# Patient Record
Sex: MALE | Race: WHITE | URBAN - METROPOLITAN AREA
[De-identification: names, ages, dates, MRNs, and addresses within clinical notes are randomized per-mention and may not be internally consistent; named-entity substitution may affect disease eponyms.]

---

## 2017-12-04 ENCOUNTER — TRANSFERRED RECORDS (OUTPATIENT)
Dept: HEALTH INFORMATION MANAGEMENT | Facility: CLINIC | Age: 57
End: 2017-12-04

## 2017-12-29 NOTE — TELEPHONE ENCOUNTER
APPT INFO    Date /Time: 1/17/18 at 9AM   Reason for Appt: Otosclerosis   Ref Provider/Clinic: Jose F Turpin   Are there internal records? Yes/No?  IF YES, list clinic names: No   Are there outside records? Yes/No? [facility]   Patient Contact (Y/N) & Call Details: No   Action: CareEverywhere records reviewed. See CareEverywhere Tab.       OUTSIDE RECORDS CHECKLIST     CLINIC NAME COMMENTS REC (x) IMG (x)   [facility] -Care Everywhere Office note 12/4/17  Audiogram 12/4/17 X none

## 2018-01-11 DIAGNOSIS — H80.90 OTOSCLEROSIS: Primary | ICD-10-CM

## 2018-01-12 NOTE — TELEPHONE ENCOUNTER
Records Received From:  [facility]    DATE/EXAM/LOCATION  (specify location if different)   Audiogram: Audiograph 12/4/17

## 2018-01-17 ENCOUNTER — OFFICE VISIT (OUTPATIENT)
Dept: AUDIOLOGY | Facility: CLINIC | Age: 58
End: 2018-01-17
Payer: COMMERCIAL

## 2018-01-17 ENCOUNTER — PRE VISIT (OUTPATIENT)
Dept: OTOLARYNGOLOGY | Facility: CLINIC | Age: 58
End: 2018-01-17

## 2018-01-17 ENCOUNTER — OFFICE VISIT (OUTPATIENT)
Dept: OTOLARYNGOLOGY | Facility: CLINIC | Age: 58
End: 2018-01-17
Payer: COMMERCIAL

## 2018-01-17 VITALS — BODY MASS INDEX: 29.82 KG/M2 | WEIGHT: 225 LBS | HEIGHT: 73 IN

## 2018-01-17 DIAGNOSIS — H90.72 MIXED CONDUCTIVE AND SENSORINEURAL HEARING LOSS OF LEFT EAR WITH UNRESTRICTED HEARING OF RIGHT EAR: Primary | ICD-10-CM

## 2018-01-17 DIAGNOSIS — H80.92 OTOSCLEROSIS OF LEFT EAR: Primary | ICD-10-CM

## 2018-01-17 RX ORDER — NAPROXEN SODIUM 220 MG
440 TABLET ORAL
COMMUNITY

## 2018-01-17 ASSESSMENT — PAIN SCALES - GENERAL: PAINLEVEL: MILD PAIN (2)

## 2018-01-17 NOTE — PATIENT INSTRUCTIONS
Patient to review pre operative information.   Patient to schedule a pre-op physical with their primary MD or with our Preoperative Assessment Clinic within 30 days of the procedure.    Patient to avoid blood thinning medications 1 week prior to surgery (Ibuprofen, Aleve, Aspirin, fish oil )   Use the antiseptic scrub as directed.   You will need to schedule a post operative appointment for 3 weeks after surgery    Patient to call the ENT clinic with further questions or concerns:   ENT Triage Nurse  [phone] option 3  ENT appointment scheduling [phone] option 1  ENT surgery scheduling, Brianda [phone]  ENT Nurse Maggie [phone]

## 2018-01-17 NOTE — MR AVS SNAPSHOT
After Visit Summary   1/17/2018    Fermín Pineda    MRN: [id number]           Patient Information     Date Of Birth          1960        Visit Information        Provider Department      1/17/2018 8:30 AM Crista Gnozalez, Nadya BALES [facility] Audiology        Today's Diagnoses     Mixed conductive and sensorineural hearing loss of left ear with unrestricted hearing of right ear    -  1       Follow-ups after your visit        Who to contact     Please call your clinic at [phone] to:    Ask questions about your health    Make or cancel appointments    Discuss your medicines    Learn about your test results    Speak to your doctor   If you have compliments or concerns about an experience at your clinic, or if you wish to file a complaint, please contact [facility] Patient Relations at [phone] or email us at [email or web address]         Additional Information About Your Visit        MyChart Information     [employer] gives you secure access to your electronic health record. If you see a primary care provider, you can also send messages to your care team and make appointments. If you have questions, please call your primary care clinic.  If you do not have a primary care provider, please call [phone] and they will assist you.      [employer] is an electronic gateway that provides easy, online access to your medical records. With [employer], you can request a clinic appointment, read your test results, renew a prescription or communicate with your care team.     To access your existing account, please contact your [facility] or call [phone] for assistance.        Care EveryWhere ID     This is your Care EveryWhere ID. This could be used by other organizations to access your [facility] medical records  [id number]         Blood Pressure from Last 3 Encounters:   No data found for BP    Weight from Last 3 Encounters:   01/17/18 102.1  kg (225 lb)              We Performed the Following     AUDIOGRAM/TYMPANOGRAM - INTERFACE     [facility] Audiometry Thrshld Eval & Speech Recog (85426)     Tymps / Reflex   ([id number])        Primary Care Provider    None Specified       No primary provider on file.        Equal Access to Services     NAGI SINHA : Hadii andrés laird hadshakeelo Soomaali, waaxda luqadaha, qaybta kaalmada adeegyada, waxantonette idiin haycatalinon adeserina natarajan cristian tillman. So [facility] [phone].    ATENCIÓN: Si habla español, tiene a mayers disposición servicios gratuitos de asistencia lingüística. Llame al [phone].    We comply with applicable federal civil rights laws and [place] laws. We do not discriminate on the basis of race, color, national origin, age, disability, sex, sexual orientation, or gender identity.            Thank you!     Thank you for choosing [facility] AUDIOLOGY  for your care. Our goal is always to provide you with excellent care. Hearing back from our patients is one way we can continue to improve our services. Please take a few minutes to complete the written survey that you may receive in the mail after your visit with us. Thank you!             Your Updated Medication List - Protect others around you: Learn how to safely use, store and throw away your medicines at www.disposemymeds.org.          This list is accurate as of: 1/17/18 11:08 AM.  Always use your most recent med list.                   Brand Name Dispense Instructions for use Diagnosis    naproxen sodium 220 MG tablet    ANAPROX     Take 440 mg by mouth

## 2018-01-17 NOTE — PROGRESS NOTES
AUDIOLOGY REPORT    SUMMARY: Audiology visit completed. See audiogram for results.      RECOMMENDATIONS: Follow-up with ENT.    Felecia Steen, [facility] [place]  Licensed Audiologist  [place] License #[id number]

## 2018-01-17 NOTE — PROGRESS NOTES
Fermín MONICA Pineda is seen in consultation from Dr. Turpin.  He is a 57 year old male being seen for left hearing loss.  He reports hearing loss since he was in college which has progressively worsened.  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  He has autophony to his own voice on the left although no tinnitus on the left.  He has some tinnitus on the right.  No otalgia or otorrhea.  He reports that he had a lot of ear infections as a child which improved by the time he was in [employer].  No ear surgeries.    Past Medical History:   Diagnosis Date     Allergic rhinitis 1970    use Benadryl or Rhinocort when symptoms require     Arthritis 1975    progressive degenerative spinal - 3 surgeries     Conductive hearing loss 1990    has become progressively worse only in left ear     Hearing problem     as noted previously - progressive loss in left ear     Hypertension 1982    resolved w/medication which I have not needed since 1990     Migraines 1985    infrequent - last one approx 10 years ago     Sleep apnea 2005    use CPAP every night religiously     Thyroid disease 2000    temporary hyperthyroid condition post-surgery resolved w/med     Uncomplicated asthma 1985    resolved w/Ventolin inhaler - not used in many years now       Past Surgical History:   Procedure Laterality Date     HEAD & NECK SURGERY  2000, 2017    lumbar laminectomy 2000 & 2017, Cervical fusion 2017     ORTHOPEDIC SURGERY  1975    Right knee surgery for sports injury       FHx:  His children had PE tubes x 2 as young children, his youngest son has hearing loss which they think is secondary to noise exposure in the , his grandmother was essentially deaf which occurred after she survived the flu epidemic.    Social History   Substance Use Topics     Smoking status: Light Tobacco Smoker     Packs/day: 0.50     Years: 40.00     Types: Cigarettes     Last attempt to quit: 1/12/2018      Smokeless tobacco: Not on file     Alcohol use Yes      Comment: beer / wine 6 per week       Patient Supplied Answers to Review of Systems   ENT ROS 1/12/2018   Neurology Numbness   Ears, Nose, Throat Hearing loss, Ear pain, Ringing/noise in ears, Nasal congestion or drainage   Musculoskeletal Sore or stiff joints, Back pain, Neck pain   The remainder of the 10 point review of systems is otherwise negative.    Physical examination:  Constitutional:  In no acute distress, appears stated age  Eyes:  Extraocular movements intact, no spontaneous nystagmus  Ears:  Both ears examined under the microscope.  Ear canals clear, TMs intact with aerated middle ears, very minimal epitympanic retractions.  Singleton to the left.  Rinne showed bone greater than air on the left, air greater than bone on the right.  Respiratory:  No increased work of breathing, wheezing or stridor  Musculoskeletal:  Good upper extremity strength  Skin:  No rashes on the head and neck  Neurologic:  House Brackman 1/6 bilaterally, ambulating normally  Psychiatric:  Alert, normal affect, answering questions appropriately    Audiogram:  Normal right hearing.  Left moderate upsloping to mild/moderate conductive hearing loss.  100% speech discrimination bilaterally, normal tympanograms, absent reflexes throughout.    Assessment and plan:  Likely left otosclerosis.  He recalled seeing an ENT 20 years ago and was told his inner ear hearing was fine and that his hearing loss was not bad enough for surgery.  He does not have a severe enough conductive hearing loss to warrant surgical intervention.  Left stapedotomy was discussed.  The risks and benefits were discussed.  The risks include but are not limited to:  Worsened hearing which may require further surgery, profound and irreversible hearing loss, dizziness, damage to the taste nerve, damage to the facial nerve, tympanic membrane perforation requiring further surgery and infection.  Postoperative  restrictions were discussed.  He had his questions answered and would like to proceed.  He would like to have it done under MAC plus local.

## 2018-01-17 NOTE — NURSING NOTE
Teaching Flowsheet - ENT   Relevant Diagnosis: otosclerosis-   Teaching Topic:left stapes  Person(s) involved in teaching:  Patient     Motivation Level:  Asks Questions:   Yes  Eager to Learn:   Yes  Cooperative:   Yes  Receptive (willing/able to accept information):   Yes  Comments: Reviewed pre-op H and P,  NPO prior to  surgery,  pre-op scrub (given Hibiclens)  Reviewed post-op  cares including  ear/wound care, activity and pain.     Patient demonstrates understanding of the following:  Reason for the appointment, diagnosis and treatment plan:   Yes  Knowledge of proper use of medications and conditions for which they are ordered (with special attention to potential side effects or drug interactions):  stop aspirin products 1 week before surgery Yes  Which situations necessitate calling provider and whom to contact:   Yes  Nutritional needs and diet plan:   Yes  Pain management techniques:   Yes  Patient instructed on hand hygiene:  Yes  How and/when to access community resources:   Yes     Infection Prevention:  Patient   demonstrates understanding of the following:  Surgical procedure site care taught Yes  Signs and symptoms of infection taught Yes  Wound care taught Yes  Instructional Materials Used/Given: pre- op booklet, ear surgery  handout and verbal  Instruction.  Works involves computers/phone.

## 2018-01-17 NOTE — NURSING NOTE
"Chief Complaint   Patient presents with     Consult     Otosclerosis of left ear      Height 1.854 m (6' 1\"), weight 102.1 kg (225 lb).    Barron Martin    "

## 2018-01-17 NOTE — LETTER
1/17/2018       RE: Fermín Pineda  [address]  [place] [address]     Dear Colleague,    Thank you for referring your patient, Fermín Pineda, to the [facility] EAR NOSE AND THROAT at [facility]. Please see a copy of my visit note below.    Fermín Pineda is seen in consultation from Dr. Turpin.  He is a 57 year old male being seen for left hearing loss.  He reports hearing loss since he was in college which has progressively worsened.  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  He has autophony to his own voice on the left although no tinnitus on the left.  He has some tinnitus on the right.  No otalgia or otorrhea.  He reports that he had a lot of ear infections as a child which improved by the time he was in [employer].  No ear surgeries.    Past Medical History:   Diagnosis Date     Allergic rhinitis 1970    use Benadryl or Rhinocort when symptoms require     Arthritis 1975    progressive degenerative spinal - 3 surgeries     Conductive hearing loss 1990    has become progressively worse only in left ear     Hearing problem     as noted previously - progressive loss in left ear     Hypertension 1982    resolved w/medication which I have not needed since 1990     Migraines 1985    infrequent - last one approx 10 years ago     Sleep apnea 2005    use CPAP every night religiously     Thyroid disease 2000    temporary hyperthyroid condition post-surgery resolved w/med     Uncomplicated asthma 1985    resolved w/Ventolin inhaler - not used in many years now       Past Surgical History:   Procedure Laterality Date     HEAD & NECK SURGERY  2000, 2017    lumbar laminectomy 2000 & 2017, Cervical fusion 2017     ORTHOPEDIC SURGERY  1975    Right knee surgery for sports injury       FHx:  His children had PE tubes x 2 as young children, his youngest son has hearing loss which they think is secondary to noise  exposure in the , his grandmother was essentially deaf which occurred after she survived the flu epidemic.    Social History   Substance Use Topics     Smoking status: Light Tobacco Smoker     Packs/day: 0.50     Years: 40.00     Types: Cigarettes     Last attempt to quit: 1/12/2018     Smokeless tobacco: Not on file     Alcohol use Yes      Comment: beer / wine 6 per week       Patient Supplied Answers to Review of Systems  [facility] ENT ROS 1/12/2018   Neurology Numbness   Ears, Nose, Throat Hearing loss, Ear pain, Ringing/noise in ears, Nasal congestion or drainage   Musculoskeletal Sore or stiff joints, Back pain, Neck pain   The remainder of the 10 point review of systems is otherwise negative.    Physical examination:  Constitutional:  In no acute distress, appears stated age  Eyes:  Extraocular movements intact, no spontaneous nystagmus  Ears:  Both ears examined under the microscope.  Ear canals clear, TMs intact with aerated middle ears, very minimal epitympanic retractions.  Singleton to the left.  Rinne showed bone greater than air on the left, air greater than bone on the right.  Respiratory:  No increased work of breathing, wheezing or stridor  Musculoskeletal:  Good upper extremity strength  Skin:  No rashes on the head and neck  Neurologic:  House Brackman 1/6 bilaterally, ambulating normally  Psychiatric:  Alert, normal affect, answering questions appropriately    Audiogram:  Normal right hearing.  Left moderate upsloping to mild/moderate conductive hearing loss.  100% speech discrimination bilaterally, normal tympanograms, absent reflexes throughout.    Assessment and plan:  Likely left otosclerosis.  He recalled seeing an ENT 20 years ago and was told his inner ear hearing was fine and that his hearing loss was not bad enough for surgery.  He does not have a severe enough conductive hearing loss to warrant surgical intervention.  Left stapedotomy was discussed.  The risks and benefits were discussed.   The risks include but are not limited to:  Worsened hearing which may require further surgery, profound and irreversible hearing loss, dizziness, damage to the taste nerve, damage to the facial nerve, tympanic membrane perforation requiring further surgery and infection.  Postoperative restrictions were discussed.  He had his questions answered and would like to proceed.  He would like to have it done under MAC plus local.    Again, thank you for allowing me to participate in the care of your patient.      Sincerely,    Brina Brambila MD

## 2018-01-17 NOTE — MR AVS SNAPSHOT
After Visit Summary   1/17/2018    Fermín Pineda    MRN: [id number]           Patient Information     Date Of Birth          1960        Visit Information        Provider Department      1/17/2018 9:00 AM Brina Brambila MD [facility] Ear Nose and Throat        Care Instructions     Patient to review pre operative information.   Patient to schedule a pre-op physical with their primary MD or with our Preoperative Assessment Clinic within 30 days of the procedure.    Patient to avoid blood thinning medications 1 week prior to surgery (Ibuprofen, Aleve, Aspirin, fish oil )   Use the antiseptic scrub as directed.   You will need to schedule a post operative appointment for 3 weeks after surgery    Patient to call the ENT clinic with further questions or concerns:   ENT Triage Nurse  [phone] option 3  ENT appointment scheduling [phone] option 1  ENT surgery scheduling, Brianda [phone]  ENT Nurse Maggie [phone]          Follow-ups after your visit        Who to contact     Please call your clinic at [phone] to:    Ask questions about your health    Make or cancel appointments    Discuss your medicines    Learn about your test results    Speak to your doctor   If you have compliments or concerns about an experience at your clinic, or if you wish to file a complaint, please contact [facility] Patient Relations at [phone] or email us at [email or web address]         Additional Information About Your Visit        MyChart Information     [employer] gives you secure access to your electronic health record. If you see a primary care provider, you can also send messages to your care team and make appointments. If you have questions, please call your primary care clinic.  If you do not have a primary care provider, please call [phone] and they will assist you.      [employer] is an electronic gateway that provides easy, online access to your medical  "records. With [employer], you can request a clinic appointment, read your test results, renew a prescription or communicate with your care team.     To access your existing account, please contact your [facility] or call [phone] for assistance.        Care EveryWhere ID     This is your Care EveryWhere ID. This could be used by other organizations to access your [facility] medical records  [id number]        Your Vitals Were     Height BMI (Body Mass Index)                1.854 m (6' 1\") 29.69 kg/m2           Blood Pressure from Last 3 Encounters:   No data found for BP    Weight from Last 3 Encounters:   01/17/18 102.1 kg (225 lb)              Today, you had the following     No orders found for display       Primary Care Provider    None Specified       No primary provider on file.        Equal Access to Services     NAGI SINHA : Yusuf Ritchie, waaxda luqadaha, qaybta kaalmada aedline, elia foster . So [facility] [phone].    ATENCIÓN: Si habla español, tiene a mayers disposición servicios gratuitos de asistencia lingüística. Llame al [phone].    We comply with applicable federal civil rights laws and [place] laws. We do not discriminate on the basis of race, color, national origin, age, disability, sex, sexual orientation, or gender identity.            Thank you!     Thank you for choosing [facility] EAR NOSE AND THROAT  for your care. Our goal is always to provide you with excellent care. Hearing back from our patients is one way we can continue to improve our services. Please take a few minutes to complete the written survey that you may receive in the mail after your visit with us. Thank you!             Your Updated Medication List - Protect others around you: Learn how to safely use, store and throw away your medicines at www.disposemymeds.org.          This list is accurate as of: 1/17/18  9:33 AM.  Always use your most recent " med list.                   Brand Name Dispense Instructions for use Diagnosis    naproxen sodium 220 MG tablet    ANAPROX     Take 440 mg by mouth

## 2018-01-23 ENCOUNTER — TELEPHONE (OUTPATIENT)
Dept: OTOLARYNGOLOGY | Facility: CLINIC | Age: 58
End: 2018-01-23

## 2018-01-23 NOTE — TELEPHONE ENCOUNTER
1/23/18  Call out to patient    Surgery scheduled with Dr Brambila 4/11/18 at the [facility] for Lt Stapedotomy    PAC call 4/9/18  3:30 pm    PE: Dr Fermín Rojas - Sae Villatoro    Post Op - 5/3/18 8:30 am Dr Brambila    Teaching and soap done by Maggie RODRIGUEZ  ENT RNCNC    Lorena Redmond   ENT Sonam-Op Coordinator  [phone]

## 2018-07-26 ENCOUNTER — ANESTHESIA EVENT (OUTPATIENT)
Dept: SURGERY | Facility: AMBULATORY SURGERY CENTER | Age: 58
End: 2018-07-26

## 2018-07-27 ENCOUNTER — HOSPITAL ENCOUNTER (OUTPATIENT)
Facility: AMBULATORY SURGERY CENTER | Age: 58
End: 2018-07-27
Attending: OTOLARYNGOLOGY
Payer: COMMERCIAL

## 2018-07-27 ENCOUNTER — SURGERY (OUTPATIENT)
Age: 58
End: 2018-07-27
Payer: COMMERCIAL

## 2018-07-27 ENCOUNTER — ANESTHESIA (OUTPATIENT)
Dept: SURGERY | Facility: AMBULATORY SURGERY CENTER | Age: 58
End: 2018-07-27

## 2018-07-27 VITALS
WEIGHT: 214 LBS | BODY MASS INDEX: 28.36 KG/M2 | SYSTOLIC BLOOD PRESSURE: 135 MMHG | RESPIRATION RATE: 16 BRPM | OXYGEN SATURATION: 95 % | TEMPERATURE: 97 F | DIASTOLIC BLOOD PRESSURE: 86 MMHG | HEART RATE: 65 BPM | HEIGHT: 73 IN

## 2018-07-27 DIAGNOSIS — H80.92 OTOSCLEROSIS OF LEFT EAR: Primary | ICD-10-CM

## 2018-07-27 DIAGNOSIS — G89.18 POSTOPERATIVE PAIN: ICD-10-CM

## 2018-07-27 DEVICE — IMPLANTABLE DEVICE: Type: IMPLANTABLE DEVICE | Site: EAR | Status: FUNCTIONAL

## 2018-07-27 RX ORDER — PROPOFOL 10 MG/ML
INJECTION, EMULSION INTRAVENOUS PRN
Status: DISCONTINUED | OUTPATIENT
Start: 2018-07-27 | End: 2018-07-27

## 2018-07-27 RX ORDER — FENTANYL CITRATE 50 UG/ML
25-50 INJECTION, SOLUTION INTRAMUSCULAR; INTRAVENOUS
Status: DISCONTINUED | OUTPATIENT
Start: 2018-07-27 | End: 2018-07-27 | Stop reason: HOSPADM

## 2018-07-27 RX ORDER — OFLOXACIN 3 MG/ML
5 SOLUTION AURICULAR (OTIC) 2 TIMES DAILY
Qty: 7 ML | Refills: 0 | Status: SHIPPED | OUTPATIENT
Start: 2018-08-03 | End: 2018-08-17

## 2018-07-27 RX ORDER — ONDANSETRON 4 MG/1
4 TABLET, ORALLY DISINTEGRATING ORAL EVERY 30 MIN PRN
Status: DISCONTINUED | OUTPATIENT
Start: 2018-07-27 | End: 2018-07-28 | Stop reason: HOSPADM

## 2018-07-27 RX ORDER — FENTANYL CITRATE 50 UG/ML
INJECTION, SOLUTION INTRAMUSCULAR; INTRAVENOUS PRN
Status: DISCONTINUED | OUTPATIENT
Start: 2018-07-27 | End: 2018-07-27

## 2018-07-27 RX ORDER — SODIUM CHLORIDE, SODIUM LACTATE, POTASSIUM CHLORIDE, CALCIUM CHLORIDE 600; 310; 30; 20 MG/100ML; MG/100ML; MG/100ML; MG/100ML
INJECTION, SOLUTION INTRAVENOUS CONTINUOUS
Status: DISCONTINUED | OUTPATIENT
Start: 2018-07-27 | End: 2018-07-27 | Stop reason: HOSPADM

## 2018-07-27 RX ORDER — ACETAMINOPHEN 325 MG/1
650 TABLET ORAL
Status: DISCONTINUED | OUTPATIENT
Start: 2018-07-27 | End: 2018-07-28 | Stop reason: HOSPADM

## 2018-07-27 RX ORDER — ONDANSETRON 2 MG/ML
4 INJECTION INTRAMUSCULAR; INTRAVENOUS EVERY 30 MIN PRN
Status: DISCONTINUED | OUTPATIENT
Start: 2018-07-27 | End: 2018-07-28 | Stop reason: HOSPADM

## 2018-07-27 RX ORDER — LIDOCAINE HYDROCHLORIDE 20 MG/ML
INJECTION, SOLUTION INFILTRATION; PERINEURAL PRN
Status: DISCONTINUED | OUTPATIENT
Start: 2018-07-27 | End: 2018-07-27

## 2018-07-27 RX ORDER — PROPOFOL 10 MG/ML
INJECTION, EMULSION INTRAVENOUS CONTINUOUS PRN
Status: DISCONTINUED | OUTPATIENT
Start: 2018-07-27 | End: 2018-07-27

## 2018-07-27 RX ORDER — ACETAMINOPHEN 325 MG/1
975 TABLET ORAL ONCE
Status: COMPLETED | OUTPATIENT
Start: 2018-07-27 | End: 2018-07-27

## 2018-07-27 RX ORDER — SODIUM CHLORIDE, SODIUM LACTATE, POTASSIUM CHLORIDE, CALCIUM CHLORIDE 600; 310; 30; 20 MG/100ML; MG/100ML; MG/100ML; MG/100ML
INJECTION, SOLUTION INTRAVENOUS CONTINUOUS
Status: DISCONTINUED | OUTPATIENT
Start: 2018-07-27 | End: 2018-07-28 | Stop reason: HOSPADM

## 2018-07-27 RX ORDER — HYDROCODONE BITARTRATE AND ACETAMINOPHEN 5; 325 MG/1; MG/1
1 TABLET ORAL
Status: DISCONTINUED | OUTPATIENT
Start: 2018-07-27 | End: 2018-07-28 | Stop reason: HOSPADM

## 2018-07-27 RX ORDER — KETOROLAC TROMETHAMINE 30 MG/ML
30 INJECTION, SOLUTION INTRAMUSCULAR; INTRAVENOUS EVERY 6 HOURS PRN
Status: DISCONTINUED | OUTPATIENT
Start: 2018-07-27 | End: 2018-07-28 | Stop reason: HOSPADM

## 2018-07-27 RX ORDER — GABAPENTIN 300 MG/1
300 CAPSULE ORAL ONCE
Status: COMPLETED | OUTPATIENT
Start: 2018-07-27 | End: 2018-07-27

## 2018-07-27 RX ORDER — NALOXONE HYDROCHLORIDE 0.4 MG/ML
.1-.4 INJECTION, SOLUTION INTRAMUSCULAR; INTRAVENOUS; SUBCUTANEOUS
Status: DISCONTINUED | OUTPATIENT
Start: 2018-07-27 | End: 2018-07-28 | Stop reason: HOSPADM

## 2018-07-27 RX ORDER — ONDANSETRON 2 MG/ML
INJECTION INTRAMUSCULAR; INTRAVENOUS PRN
Status: DISCONTINUED | OUTPATIENT
Start: 2018-07-27 | End: 2018-07-27

## 2018-07-27 RX ORDER — HYDROCODONE BITARTRATE AND ACETAMINOPHEN 5; 325 MG/1; MG/1
1 TABLET ORAL EVERY 6 HOURS PRN
Qty: 8 TABLET | Refills: 0 | Status: SHIPPED | OUTPATIENT
Start: 2018-07-27

## 2018-07-27 RX ORDER — DEXAMETHASONE SODIUM PHOSPHATE 10 MG/ML
10 INJECTION, SOLUTION INTRAMUSCULAR; INTRAVENOUS ONCE
Status: COMPLETED | OUTPATIENT
Start: 2018-07-27 | End: 2018-07-27

## 2018-07-27 RX ORDER — OXYCODONE HYDROCHLORIDE 5 MG/1
5 TABLET ORAL EVERY 4 HOURS PRN
Status: DISCONTINUED | OUTPATIENT
Start: 2018-07-27 | End: 2018-07-28 | Stop reason: HOSPADM

## 2018-07-27 RX ORDER — MEPERIDINE HYDROCHLORIDE 25 MG/ML
12.5 INJECTION INTRAMUSCULAR; INTRAVENOUS; SUBCUTANEOUS
Status: DISCONTINUED | OUTPATIENT
Start: 2018-07-27 | End: 2018-07-28 | Stop reason: HOSPADM

## 2018-07-27 RX ADMIN — DEXAMETHASONE SODIUM PHOSPHATE 4 MG: 10 INJECTION, SOLUTION INTRAMUSCULAR; INTRAVENOUS at 07:12

## 2018-07-27 RX ADMIN — GABAPENTIN 300 MG: 300 CAPSULE ORAL at 06:16

## 2018-07-27 RX ADMIN — SODIUM CHLORIDE, SODIUM LACTATE, POTASSIUM CHLORIDE, CALCIUM CHLORIDE: 600; 310; 30; 20 INJECTION, SOLUTION INTRAVENOUS at 06:17

## 2018-07-27 RX ADMIN — LIDOCAINE HYDROCHLORIDE 100 MG: 20 INJECTION, SOLUTION INFILTRATION; PERINEURAL at 07:12

## 2018-07-27 RX ADMIN — PROPOFOL 200 MCG/KG/MIN: 10 INJECTION, EMULSION INTRAVENOUS at 07:12

## 2018-07-27 RX ADMIN — OXYCODONE HYDROCHLORIDE 5 MG: 5 TABLET ORAL at 08:33

## 2018-07-27 RX ADMIN — PROPOFOL 100 MG: 10 INJECTION, EMULSION INTRAVENOUS at 07:25

## 2018-07-27 RX ADMIN — ONDANSETRON 4 MG: 2 INJECTION INTRAMUSCULAR; INTRAVENOUS at 07:26

## 2018-07-27 RX ADMIN — FENTANYL CITRATE 50 MCG: 50 INJECTION, SOLUTION INTRAMUSCULAR; INTRAVENOUS at 07:25

## 2018-07-27 RX ADMIN — PROPOFOL 200 MG: 10 INJECTION, EMULSION INTRAVENOUS at 07:13

## 2018-07-27 RX ADMIN — ACETAMINOPHEN 975 MG: 325 TABLET ORAL at 06:16

## 2018-07-27 NOTE — ANESTHESIA POSTPROCEDURE EVALUATION
Patient: Fermín Pineda    Procedure(s):  Left Stapedotomy with diode laser - Wound Class: I-Clean    Diagnosis:Otosclerosis  Diagnosis Additional Information: No value filed.    Anesthesia Type:  MAC    Note:  Anesthesia Post Evaluation    Patient location during evaluation: PACU  Patient participation: Able to fully participate in evaluation  Level of consciousness: awake and alert  Pain management: adequate  Airway patency: patent  Cardiovascular status: acceptable  Respiratory status: acceptable  Hydration status: acceptable  PONV: none             Last vitals:  Vitals:    07/27/18 0830 07/27/18 0845 07/27/18 0855   BP: (!) 132/91 131/84 135/86   Pulse:      Resp: 21 18 16   Temp:  36.1  C (97  F) 36.1  C (97  F)   SpO2: 94%  95%         Electronically Signed By: Terence Carvajal MD  July 27, 2018  9:34 AM

## 2018-07-27 NOTE — ANESTHESIA CARE TRANSFER NOTE
Patient: Fermín Pineda    Procedure(s):  Left Stapedotomy with diode laser - Wound Class: I-Clean    Diagnosis: Otosclerosis  Diagnosis Additional Information: No value filed.    Anesthesia Type:   MAC     Note:  Airway :Room Air  Patient transferred to:PACU  Comments: Report to RN    136/81, 71, 16, 93%, 96.2Handoff Report: Identifed the Patient, Identified the Reponsible Provider, Reviewed the pertinent medical history, Discussed the surgical course, Reviewed Intra-OP anesthesia mangement and issues during anesthesia, Set expectations for post-procedure period and Allowed opportunity for questions and acknowledgement of understanding      Vitals: (Last set prior to Anesthesia Care Transfer)    CRNA VITALS  7/27/2018 0750 - 7/27/2018 0824      7/27/2018             Pulse: 79    SpO2: 97 %    Resp Rate (observed): (!)  1    Resp Rate (set): 10                Electronically Signed By: DEMETRA Hernandez CRNA  July 27, 2018  8:24 AM

## 2018-07-27 NOTE — BRIEF OP NOTE
[facility]    Brief Operative Note    Pre-operative diagnosis: Otosclerosis  Post-operative diagnosis Otosclerosis  Procedure: Procedure(s):  Left Stapedotomy with diode laser - Wound Class: I-Clean  Surgeon: Surgeon(s) and Role:     * Brina Brambila MD - Primary     * Ivett Vergara MD - Resident  Anesthesia: General   Estimated blood loss: 5cc  Drains: None  Specimens: * No specimens in log *  Findings:   Immobility of the stapes footplate.  Complications: None.  Implants: 4.75mm BigEasy prosthesis.

## 2018-07-27 NOTE — OP NOTE
Date of service:  7/27/18    Preoperative diagnosis:  Otosclerosis    Postoperative diagnosis:  Otosclerosis    Procedure:  1.  Left stapedotomy with diode laser  2.  Facial nerve monitoring x 0.75 hours    Surgeon:  Brina Brambila MD    Resident:  Ivett Vergara MD    Anesthesia:  General    EBL:  5cc    Specimens:  None    Complications:  None    Findings:  Stapes footplate fixed, 4.75 mm BigEasy prosthesis placed    Indications:  Fermín Pineda is a patient with left otosclerosis.  Discussion was had about stapedotomy with diode laser.  Risks and benefits had been discussed and consent had been obtained.    Procedure:  The patient was taken to the operating room and placed supine on the operating room table.  General anesthesia was induced and endotracheal intubation was performed.  Time Out was then performed with confirmation of the patient, site and procedure.  Facial nerve electrodes were placed on the left side of the face.  Impedances were checked and the nerve was monitored for the entirety of the case.  1:100,000 epinephrine was injected into the ear canal.  The area was prepped and draped in standard surgical fashion.  The operating microscope was brought into position and used for the entirety of the case.  The ear canal was inspected and irrigated with normal saline.  The tympanic membrane was intact with an aerated middle ear.  Standard stapes incision was made and the tympanomeatal flap elevated.  The chorda tympani was identified.  The ossicular chain was identified.  The scutum was curetted.  The chorda tympani was preserved.  The ossicular chain was palpated and the lateral chain was mobile but the stapes was fixed.  The incudostapedial joint was sectioned.  The laser was then brought into position and at a setting of 500 adan continuous the stapedial tendon was sectioned.  The posterior charisse was sectioned.  The stapedial suprastracture was outfractured and removed.  The distance from the  footplate to the incus was measured and noted to be 4.75 mm.  The footplate was intact.  The laser was changed to 600 adan, 200 duration and 200 delay and a stapedotomy was made.  There was not a gusher.  A 4.75mm BigEasy prosthesis was placed and crimped without difficulty.  The entire chain was mobile.  Blood was used to seal the stapedotomy.  The tympanomeatal flap was placed back into position and gelfoam used to seal the incision.  Bacitracin was used to fill the ear canal.  A cotton ball was placed over the ear canal and the facial nerve electrodes removed.  The patient tolerated the procedure well and counts were correct.  The patient was returned to Anesthesia, awakened, extubated and taken to the PACU in stable condition.

## 2018-07-27 NOTE — IP AVS SNAPSHOT
[facility]    [address] [place] [id number]    Phone:  [phone]    Fax:  [phone]                                       After Visit Summary   7/27/2018    Fermín Pineda    MRN: [id number]           After Visit Summary Signature Page     I have received my discharge instructions, and my questions have been answered. I have discussed any challenges I see with this plan with the nurse or doctor.    ..........................................................................................................................................  Patient/Patient Representative Signature      ..........................................................................................................................................  Patient Representative Print Name and Relationship to Patient    ..................................................               ................................................  Date                                            Time    ..........................................................................................................................................  Reviewed by Signature/Title    ...................................................              ..............................................  Date                                                            Time

## 2018-07-27 NOTE — DISCHARGE INSTRUCTIONS
"1. Resume your home medications. Take pain medications as indicated. Use docusate to avoid constipation. Start your ear drops in 1 week after surgery and use until your follow up.    2. Wound care  * Change the cotton ball in your ear as needed for drainage.  It's normal to expect some drainage from your ear for the first few days after surgery.    3. Use a cotton ball coated with vasoline to keep water out of ear canal.    4. For the 2 next weeks, no heavy lifting more than 5 pounds, no strenuous activities. No nose blowing. Sneeze with your mouth open.    5. Please call MD or come to the ED for shortness of breath, trouble breathing, inability to tolerate liquids, intractable dizziness, or signs of infection such as fevers or redness.      Call the [phone] clinic number if you have any questions and concerns during the day and call [phone] at night and ask for \"ENT resident on call\".     6. Follow up with Dr. Brambila as previously scheduled.       [facility]  Home Care Following Anesthesia  For 24 hours after surgery:  1. Get plenty of rest.  A responsible adult must stay with you for at least 24 hours after you leave the surgery center.  2. Do not drive or use heavy equipment.  If you have weakness or tingling, don't drive or use heavy equipment until this feeling goes away.   3. Do not drink alcohol.   4. Avoid strenuous or risky activities.  Ask for help when climbing stairs.  5. You may feel lightheaded.  IF so, sit for a few minutes before standing.  Have someone help you get up.   6. If you have nausea (feel sick to your stomach): Drink only clear liquids such as apple juice, ginger ale, broth or 7-Up.  Rest may also help.  Be sure to drink enough fluids.  Move to a regular diet as you feel able.   7. You may have a slight fever.  Call the doctor if your fever is over 100 F (37.7 C) (taken under the tongue) or lasts longer than 24 hours.  8. You may have a dry " "mouth, a sore throat, muscle aches or trouble sleeping. These should go away after 24 hours.  9. Do not make important or legal decisions.        Today you received a Marcaine or bupivacaine block to numb the nerves near your surgery site.  This is a block using local anesthetic or \"numbing\" medication injected around the nerves to anesthetize or \"numb\" the area supplied by those nerves.  This block is injected into the muscle layer near your surgical site.  The medication may numb the location where you had surgery for 6-18 hours, but may last up to 24 hours.  If your surgical site is an arm or leg you should be careful with your affected limb, since it is possible to injure your limb without being aware of it due to the numbing.  Until full feeling returns, you should guard against bumping or hitting your limb, and avoid extreme hot or cold temperatures on the skin.  As the block wears off, the feeling will return as a tingling or prickly sensation near your surgical site.  You will experience more discomfort from your incision as the feeling returns.  You may want to take a pain pill (a narcotic or Tylenol if this was prescribed by your surgeon) when you start to experience mild pain before the pain beccomes more severe.  If your pain medications do not control your pain you should notifiy your surgeon.    Tips for taking pain medications  To get the best pain relief possible, remember these points:    Take pain medications as directed, before pain becomes severe.    Pain medication can upset your stomach: taking it with food may help.    Constipation is a common side effect of pain medication. Drink plenty of  fluids.    Eat foods high in fiber. Take a stool softener if recommended by your doctor or pharmacist.    Do not drink alcohol, drive or operate machinery while taking pain medications.    Ask about other ways to control pain, such as with heat, ice or relaxation.    Tylenol/Acetaminophen Consumption  To " help encourage the safe use of acetaminophen, the makers of TYLENOL  have lowered the maximum daily dose for single-ingredient Extra Strength TYLENOL  (acetaminophen) products sold in the [place] from 8 pills per day (4,000 mg) to 6 pills per day (3,000 mg). The dosing interval has also changed from 2 pills every 4-6 hours to 2 pills every 6 hours.    If you feel your pain relief is insufficient, you may take Tylenol/Acetaminophen in addition to your narcotic pain medication.     Be careful not to exceed 3,000 mg of Tylenol/Acetaminophen in a 24 hour period from all sources.    If you are taking extra strength Tylenol/acetaminophen (500 mg), the maximum dose is 6 tablets in 24 hours.    If you are taking regular strength acetaminophen (325 mg), the maximum dose is 9 tablets in 24 hours.    Call a doctor for any of the followin. Signs of infection (fever, growing tenderness at the surgery site, a large amount of drainage or bleeding, severe pain, foul-smelling drainage, redness, swelling).  2. It has been over 8 to 10 hours since surgery and you are still not able to urinate (pass water).  3. Headache for over 24 hours.  4. Numbness, tingling or weakness the day after surgery (if you had spinal anesthesia).  Your doctor is:  Dr. Brina Brambila, ENT Otolaryngology: [phone]                    Or dial [phone] and ask for the resident on call for:  ENT Otolaryngology  For emergency care, call the:  [facility]:  [phone] (TTY for hearing impaired: [phone])

## 2018-07-27 NOTE — IP AVS SNAPSHOT
MRN:[id number]                      After Visit Summary   7/27/2018    Fermín Pineda    MRN: [id number]           Thank you!     Thank you for choosing [facility] for your care. Our goal is always to provide you with excellent care. Hearing back from our patients is one way we can continue to improve our services. Please take a few minutes to complete the written survey that you may receive in the mail after you visit with us. Thank you!        Patient Information     Date Of Birth          1960        About your hospital stay     You were admitted on:  July 27, 2018 You last received care in the:  [facility]    You were discharged on:  July 27, 2018       Who to Call     For medical emergencies, please call 911.  For non-urgent questions about your medical care, please call your primary care provider or clinic, [phone]  For questions related to your surgery, please call your surgery clinic        Attending Provider     Provider Specialty    Brina Brambila MD Otolaryngology       Primary Care Provider Office Phone # Fax #    Fermín Rojas [phone]      Your next 10 appointments already scheduled     Aug 16, 2018 10:30 AM CDT   (Arrive by 10:15 AM)   Return Visit with Brina Brambila MD   [facility] ([facility])    [address] [place] [id number] [phone]              Further instructions from your care team       1. Resume your home medications. Take pain medications as indicated. Use docusate to avoid constipation. Start your ear drops in 1 week after surgery and use until your follow up.    2. Wound care  * Change the cotton ball in your ear as needed for drainage.  It's normal to expect some drainage from your ear for the first few days after surgery.    3. Use a cotton ball coated with vasoline to keep water out of ear canal.    4. For the 2 next weeks, no heavy  "lifting more than 5 pounds, no strenuous activities. No nose blowing. Sneeze with your mouth open.    5. Please call MD or come to the ED for shortness of breath, trouble breathing, inability to tolerate liquids, intractable dizziness, or signs of infection such as fevers or redness.      Call the [phone] clinic number if you have any questions and concerns during the day and call [phone] at night and ask for \"ENT resident on call\".     6. Follow up with Dr. Brambila as previously scheduled.       [facility]  Home Care Following Anesthesia  For 24 hours after surgery:  1. Get plenty of rest.  A responsible adult must stay with you for at least 24 hours after you leave the surgery center.  2. Do not drive or use heavy equipment.  If you have weakness or tingling, don't drive or use heavy equipment until this feeling goes away.   3. Do not drink alcohol.   4. Avoid strenuous or risky activities.  Ask for help when climbing stairs.  5. You may feel lightheaded.  IF so, sit for a few minutes before standing.  Have someone help you get up.   6. If you have nausea (feel sick to your stomach): Drink only clear liquids such as apple juice, ginger ale, broth or 7-Up.  Rest may also help.  Be sure to drink enough fluids.  Move to a regular diet as you feel able.   7. You may have a slight fever.  Call the doctor if your fever is over 100 F (37.7 C) (taken under the tongue) or lasts longer than 24 hours.  8. You may have a dry mouth, a sore throat, muscle aches or trouble sleeping. These should go away after 24 hours.  9. Do not make important or legal decisions.        Today you received a Marcaine or bupivacaine block to numb the nerves near your surgery site.  This is a block using local anesthetic or \"numbing\" medication injected around the nerves to anesthetize or \"numb\" the area supplied by those nerves.  This block is injected into the muscle layer near your surgical site.  " The medication may numb the location where you had surgery for 6-18 hours, but may last up to 24 hours.  If your surgical site is an arm or leg you should be careful with your affected limb, since it is possible to injure your limb without being aware of it due to the numbing.  Until full feeling returns, you should guard against bumping or hitting your limb, and avoid extreme hot or cold temperatures on the skin.  As the block wears off, the feeling will return as a tingling or prickly sensation near your surgical site.  You will experience more discomfort from your incision as the feeling returns.  You may want to take a pain pill (a narcotic or Tylenol if this was prescribed by your surgeon) when you start to experience mild pain before the pain beccomes more severe.  If your pain medications do not control your pain you should notifiy your surgeon.    Tips for taking pain medications  To get the best pain relief possible, remember these points:    Take pain medications as directed, before pain becomes severe.    Pain medication can upset your stomach: taking it with food may help.    Constipation is a common side effect of pain medication. Drink plenty of  fluids.    Eat foods high in fiber. Take a stool softener if recommended by your doctor or pharmacist.    Do not drink alcohol, drive or operate machinery while taking pain medications.    Ask about other ways to control pain, such as with heat, ice or relaxation.    Tylenol/Acetaminophen Consumption  To help encourage the safe use of acetaminophen, the makers of TYLENOL  have lowered the maximum daily dose for single-ingredient Extra Strength TYLENOL  (acetaminophen) products sold in the [place] from 8 pills per day (4,000 mg) to 6 pills per day (3,000 mg). The dosing interval has also changed from 2 pills every 4-6 hours to 2 pills every 6 hours.    If you feel your pain relief is insufficient, you may take Tylenol/Acetaminophen in addition to your narcotic  "pain medication.     Be careful not to exceed 3,000 mg of Tylenol/Acetaminophen in a 24 hour period from all sources.    If you are taking extra strength Tylenol/acetaminophen (500 mg), the maximum dose is 6 tablets in 24 hours.    If you are taking regular strength acetaminophen (325 mg), the maximum dose is 9 tablets in 24 hours.    Call a doctor for any of the followin. Signs of infection (fever, growing tenderness at the surgery site, a large amount of drainage or bleeding, severe pain, foul-smelling drainage, redness, swelling).  2. It has been over 8 to 10 hours since surgery and you are still not able to urinate (pass water).  3. Headache for over 24 hours.  4. Numbness, tingling or weakness the day after surgery (if you had spinal anesthesia).  Your doctor is:  Dr. Brina Brambila, ENT Otolaryngology: [phone]                    Or dial [phone] and ask for the resident on call for:  ENT Otolaryngology  For emergency care, call the:  [facility]:  [phone] (TTY for hearing impaired: [phone])                  Pending Results     No orders found from 2018 to 2018.            Admission Information     Date & Time Provider Department Dept. Phone    2018 Brina Brambila MD [facility] [phone]      Your Vitals Were     Blood Pressure Pulse Temperature Respirations Height Weight    136/81 65 96.7  F (35.9  C) (Temporal) 16 1.854 m (6' 1\") 97.1 kg (214 lb)    Pulse Oximetry BMI (Body Mass Index)                92% 28.23 kg/m2          [employer] Information     [employer] gives you secure access to your electronic health record. If you see a primary care provider, you can also send messages to your care team and make appointments. If you have questions, please call your primary care clinic.  If you do not have a primary care provider, please call [phone] and they will assist you.      [employer] is an electronic gateway that provides easy, " online access to your medical records. With [employer], you can request a clinic appointment, read your test results, renew a prescription or communicate with your care team.     To access your existing account, please contact your [facility] or call [phone] for assistance.        Care EveryWhere ID     This is your Care EveryWhere ID. This could be used by other organizations to access your [facility] medical records  [id number]        Equal Access to Services     NAGI SINHA : Hadii andrés ku hadasho Soomaali, waaxda luqadaha, qaybta kaalmada adeegyada, waxay katyin haycatalinon constanza billsharifshahzad tillman. So [facility] [phone].    ATENCIÓN: Si habla español, tiene a mayers disposición servicios gratuitos de asistencia lingüística. Llame al [phone].    We comply with applicable federal civil rights laws and [place] laws. We do not discriminate on the basis of race, color, national origin, age, disability, sex, sexual orientation, or gender identity.               Review of your medicines      UNREVIEWED medicines. Ask your doctor about these medicines        Dose / Directions    naproxen sodium 220 MG tablet   Commonly known as:  ANAPROX        Dose:  440 mg   Take 440 mg by mouth   Refills:  0         START taking        Dose / Directions    HYDROcodone-acetaminophen 5-325 MG per tablet   Commonly known as:  NORCO   Used for:  Postoperative pain        Dose:  1 tablet   Take 1 tablet by mouth every 6 hours as needed   Quantity:  8 tablet   Refills:  0       ofloxacin 0.3 % otic solution   Commonly known as:  FLOXIN   Used for:  Otosclerosis of left ear        Dose:  5 drop   Start taking on:  8/3/2018   Place 5 drops Into the left ear 2 times daily for 14 days   Quantity:  7 mL   Refills:  0            Where to get your medicines      These medications were sent to [facility] [place], [place] - [address] [place] [address] [phone]  [address] [place] [address] [address] [place] [address]  [id number]    Hours:  TRANSPLANT PHONE NUMBER [phone] Phone:  [phone]     ofloxacin 0.3 % otic solution         Some of these will need a paper prescription and others can be bought over the counter. Ask your nurse if you have questions.     Bring a paper prescription for each of these medications     HYDROcodone-acetaminophen 5-325 MG per tablet                Protect others around you: Learn how to safely use, store and throw away your medicines at www.disposemymeds.org.        Information about OPIOIDS     PRESCRIPTION OPIOIDS: WHAT YOU NEED TO KNOW   We gave you an opioid (narcotic) pain medicine. It is important to manage your pain, but opioids are not always the best choice. You should first try all the other options your care team gave you. Take this medicine for as short a time (and as few doses) as possible.     These medicines have risks:    DO NOT drive when on new or higher doses of pain medicine. These medicines can affect your alertness and reaction times, and you could be arrested for driving under the influence (DUI). If you need to use opioids long-term, talk to your care team about driving.    DO NOT operate heave machinery    DO NOT do any other dangerous activities while taking these medicines.     DO NOT drink any alcohol while taking these medicines.      If the opioid prescribed includes acetaminophen, DO NOT take with any other medicines that contain acetaminophen. Read all labels carefully. Look for the word  acetaminophen  or  Tylenol.  Ask your pharmacist if you have questions or are unsure.    You can get addicted to pain medicines, especially if you have a history of addiction (chemical, alcohol or substance dependence). Talk to your care team about ways to reduce this risk.    Store your pills in a secure place, locked if possible. We will not replace any lost or stolen medicine. If you don t finish your medicine, please throw away (dispose) as directed by your pharmacist. The  [place] [employer]ution [employer] has more information about safe disposal: https://www.pca.[facility].[place].us/living-green/managing-unwanted-medications.     All opioids tend to cause constipation. Drink plenty of water and eat foods that have a lot of fiber, such as fruits, vegetables, prune juice, apple juice and high-fiber cereal. Take a laxative (Miralax, milk of magnesia, Colace, Senna) if you don t move your bowels at least every other day.              Medication List: This is a list of all your medications and when to take them. Check marks below indicate your daily home schedule. Keep this list as a reference.      Medications           Morning Afternoon Evening Bedtime As Needed    HYDROcodone-acetaminophen 5-325 MG per tablet   Commonly known as:  NORCO   Take 1 tablet by mouth every 6 hours as needed                                naproxen sodium 220 MG tablet   Commonly known as:  ANAPROX   Take 440 mg by mouth                                ofloxacin 0.3 % otic solution   Commonly known as:  FLOXIN   Place 5 drops Into the left ear 2 times daily for 14 days   Start taking on:  8/3/2018

## 2018-07-29 ENCOUNTER — TELEPHONE (OUTPATIENT)
Dept: OTOLARYNGOLOGY | Facility: CLINIC | Age: 58
End: 2018-07-29

## 2018-07-29 NOTE — TELEPHONE ENCOUNTER
Patient was called on this date to check in. Patient is doing well postoperatively. Pain is well-controlled. All questions were answered.      Ivett Vergara MD  Otolaryngology- Head and Neck Surgery, PGY-2  Pager: [phone]  Please contact ENT with questions by dialing [phone] and entering job code [phone] when prompted.

## 2018-08-16 ENCOUNTER — OFFICE VISIT (OUTPATIENT)
Dept: OTOLARYNGOLOGY | Facility: CLINIC | Age: 58
End: 2018-08-16
Payer: COMMERCIAL

## 2018-08-16 VITALS
BODY MASS INDEX: 27.43 KG/M2 | SYSTOLIC BLOOD PRESSURE: 122 MMHG | WEIGHT: 207 LBS | HEIGHT: 73 IN | DIASTOLIC BLOOD PRESSURE: 85 MMHG

## 2018-08-16 DIAGNOSIS — H80.92 OTOSCLEROSIS OF LEFT EAR: Primary | ICD-10-CM

## 2018-08-16 PROBLEM — M48.02 SPINAL STENOSIS IN CERVICAL REGION: Status: ACTIVE | Noted: 2018-08-16

## 2018-08-16 PROBLEM — G47.30 SLEEP APNEA: Status: ACTIVE | Noted: 2018-08-16

## 2018-08-16 PROBLEM — F17.200 SMOKER: Status: ACTIVE | Noted: 2018-07-20

## 2018-08-16 PROBLEM — G56.00 CARPAL TUNNEL SYNDROME: Status: ACTIVE | Noted: 2018-08-16

## 2018-08-16 PROBLEM — E03.9 HYPOTHYROIDISM: Status: ACTIVE | Noted: 2018-08-16

## 2018-08-16 RX ORDER — CYCLOBENZAPRINE HCL 10 MG
TABLET ORAL
COMMUNITY

## 2018-08-16 RX ORDER — FLUTICASONE PROPIONATE 50 MCG
1 SPRAY, SUSPENSION (ML) NASAL DAILY
COMMUNITY

## 2018-08-16 RX ORDER — PREDNISONE 10 MG/1
TABLET ORAL
COMMUNITY

## 2018-08-16 RX ORDER — PREDNISONE 20 MG/1
TABLET ORAL
COMMUNITY

## 2018-08-16 ASSESSMENT — PAIN SCALES - GENERAL: PAINLEVEL: NO PAIN (0)

## 2018-08-16 NOTE — NURSING NOTE
Chief Complaint   Patient presents with     RECHECK     Patient is present for a follow up on postop surgery for left ear stapedotomy. Says he's gaining hearing in his ear every day.     Caty Collins MA  
never

## 2018-08-16 NOTE — MR AVS SNAPSHOT
After Visit Summary   8/16/2018    Fermín Pineda    MRN: [id number]           Patient Information     Date Of Birth          1960        Visit Information        Provider Department      8/16/2018 10:30 AM Brina Brambila MD [facility] Ear Nose and Throat        Care Instructions    You will need  to schedule a follow up appointment in 1 month with another hearing test (audiogram).     Please call our clinic for any questions,concerns,or worsening symptoms.      Clinic #[phone]       Option 3  for the ENT Care Team.       Option 1 for scheduling.          Follow-ups after your visit        Follow-up notes from your care team     Return in about 1 month (around 9/16/2018).      Your next 10 appointments already scheduled     Sep 19, 2018  9:00 AM CDT   Walk In From ENT with Nadya Monaco   [facility] Audiology ([facility])    [address] [place] [address] [phone]            Sep 19, 2018 10:00 AM CDT   (Arrive by 9:45 AM)   Return Visit with Brina Brambila MD   [facility] Ear Nose and Throat ([facility])    [address] [place] [address] [phone]              Who to contact     Please call your clinic at [phone] to:    Ask questions about your health    Make or cancel appointments    Discuss your medicines    Learn about your test results    Speak to your doctor            Additional Information About Your Visit        [employer] Information     [employer] gives you secure access to your electronic health record. If you see a primary care provider, you can also send messages to your care team and make appointments. If you have questions, please call your primary care clinic.  If you do not have a primary care provider, please call [phone] and they will assist you.      [employer] is an electronic gateway that provides easy, online access to your medical records. With [employer],  "you can request a clinic appointment, read your test results, renew a prescription or communicate with your care team.     To access your existing account, please contact your [facility] or call [phone] for assistance.        Care EveryWhere ID     This is your Care EveryWhere ID. This could be used by other organizations to access your [place] medical records  [id number]        Your Vitals Were     Height BMI (Body Mass Index)                1.854 m (6' 1\") 27.31 kg/m2           Blood Pressure from Last 3 Encounters:   08/16/18 122/85   07/27/18 135/86    Weight from Last 3 Encounters:   08/16/18 93.9 kg (207 lb)   07/27/18 97.1 kg (214 lb)   01/17/18 102.1 kg (225 lb)              Today, you had the following     No orders found for display       Primary Care Provider Office Phone # Fax #    Fermín Rojas [phone]       [facility] [address] [facility] [address]        Equal Access to Services     [facility] : Hadii aad ku hadasho Soomaali, waaxda luqadaha, qaybta kaalmada adeegyada, elia magallon hayfareed foster . So [facility] [phone].    ATENCIÓN: Si habla español, tiene a mayers disposición servicios gratuitos de asistencia lingüística. [facility] [phone].    We comply with applicable federal civil rights laws and [place] laws. We do not discriminate on the basis of race, color, national origin, age, disability, sex, sexual orientation, or gender identity.            Thank you!     Thank you for choosing [facility] EAR NOSE AND THROAT  for your care. Our goal is always to provide you with excellent care. Hearing back from our patients is one way we can continue to improve our services. Please take a few minutes to complete the written survey that you may receive in the mail after your visit with us. Thank you!             Your Updated Medication List - Protect others around you: Learn how to safely use, store and " throw away your medicines at www.disposemymeds.org.          This list is accurate as of 8/16/18 11:41 AM.  Always use your most recent med list.                   Brand Name Dispense Instructions for use Diagnosis    ALLEGRA PO      Take 180 mg by mouth daily        cyclobenzaprine 10 MG tablet    FLEXERIL     cyclobenzaprine 10 mg tablet        fluticasone 50 MCG/ACT spray    FLONASE     Spray 1 spray into both nostrils daily        HYDROcodone-acetaminophen 5-325 MG per tablet    NORCO    8 tablet    Take 1 tablet by mouth every 6 hours as needed    Postoperative pain       naproxen sodium 220 MG tablet    ANAPROX     Take 440 mg by mouth        ofloxacin 0.3 % otic solution    FLOXIN    7 mL    Place 5 drops Into the left ear 2 times daily for 14 days    Otosclerosis of left ear       * predniSONE 10 MG tablet    DELTASONE     prednisone 10 mg tablet        * predniSONE 20 MG tablet    DELTASONE     prednisone 20 mg tablet        * Notice:  This list has 2 medication(s) that are the same as other medications prescribed for you. Read the directions carefully, and ask your doctor or other care provider to review them with you.

## 2018-08-16 NOTE — LETTER
8/16/2018      RE: Fermín Pineda  [address]  [place] [address]       Fermín Pineda is seen for his first postoperative visit after left stapedotomy.  He is pleased as his hearing has already begun to improve.  No otorrhea and minimal otalgia even right after surgery.  No vertigo or balance issues.    Physical examination:  male in no acute distress.  Alert and answering questions appropriately.  HB 1/6 bilaterally.  Left ear examined under the microscope.  Large clot removed from the ear canal.  Canal well healed, TM intact, middle ear aerated, top of the prosthesis visible through the translucent TM.    Assessment and plan:  Doing well.  No further restrictions.  We'll see him back in 3-4 weeks with an audiogram.      Brina Brambila MD

## 2018-08-16 NOTE — PROGRESS NOTES
Fermín Pineda is seen for his first postoperative visit after left stapedotomy.  He is pleased as his hearing has already begun to improve.  No otorrhea and minimal otalgia even right after surgery.  No vertigo or balance issues.    Physical examination:  male in no acute distress.  Alert and answering questions appropriately.  HB 1/6 bilaterally.  Left ear examined under the microscope.  Large clot removed from the ear canal.  Canal well healed, TM intact, middle ear aerated, top of the prosthesis visible through the translucent TM.    Assessment and plan:  Doing well.  No further restrictions.  We'll see him back in 3-4 weeks with an audiogram.

## 2018-09-11 DIAGNOSIS — H91.90 HEARING LOSS: Primary | ICD-10-CM

## 2018-09-19 ENCOUNTER — OFFICE VISIT (OUTPATIENT)
Dept: OTOLARYNGOLOGY | Facility: CLINIC | Age: 58
End: 2018-09-19
Payer: COMMERCIAL

## 2018-09-19 ENCOUNTER — OFFICE VISIT (OUTPATIENT)
Dept: AUDIOLOGY | Facility: CLINIC | Age: 58
End: 2018-09-19
Attending: OTOLARYNGOLOGY
Payer: COMMERCIAL

## 2018-09-19 DIAGNOSIS — H80.92 OTOSCLEROSIS OF LEFT EAR: Primary | ICD-10-CM

## 2018-09-19 DIAGNOSIS — H92.01 EAR PAIN, RIGHT: Primary | ICD-10-CM

## 2018-09-19 ASSESSMENT — PAIN SCALES - GENERAL: PAINLEVEL: NO PAIN (0)

## 2018-09-19 NOTE — NURSING NOTE
Chief Complaint   Patient presents with     RECHECK     Follow Up w/ Audiology Done Today     Taj Kidd, EMT

## 2018-09-19 NOTE — MR AVS SNAPSHOT
After Visit Summary   9/19/2018    Fermín Pineda    MRN: [id number]           Patient Information     Date Of Birth          1960        Visit Information        Provider Department      9/19/2018 10:00 AM Brina Brambila MD [facility] Ear Nose and Throat        Today's Diagnoses     Otosclerosis of left ear    -  1      Care Instructions    You will need  to schedule a follow up appointment as needed.       Please call our clinic for any questions,concerns,or worsening symptoms.      Clinic #[phone]       Option 3  for the ENT Care Team.       Option 1 for scheduling.    Yany OCHOA RNCC  [phone]            Follow-ups after your visit        Follow-up notes from your care team     Return if symptoms worsen or fail to improve.      Who to contact     Please call your clinic at [phone] to:    Ask questions about your health    Make or cancel appointments    Discuss your medicines    Learn about your test results    Speak to your doctor            Additional Information About Your Visit        [employer] Information     [employer] gives you secure access to your electronic health record. If you see a primary care provider, you can also send messages to your care team and make appointments. If you have questions, please call your primary care clinic.  If you do not have a primary care provider, please call [phone] and they will assist you.      [employer] is an electronic gateway that provides easy, online access to your medical records. With [employer], you can request a clinic appointment, read your test results, renew a prescription or communicate with your care team.     To access your existing account, please contact your [facility] or call [phone] for assistance.        Care EveryWhere ID     This is your Care EveryWhere ID. This could be used by other organizations to access your [place] medical records  [id number]         Blood Pressure from Last 3  Encounters:   08/16/18 122/85   07/27/18 135/86    Weight from Last 3 Encounters:   08/16/18 93.9 kg (207 lb)   07/27/18 97.1 kg (214 lb)   01/17/18 102.1 kg (225 lb)              Today, you had the following     No orders found for display       Primary Care Provider Office Phone # Fax #    Fermín Rojas [phone]       [facility] [address] [facility] [address]        Equal Access to Services     [facility]: Hadii aad ku hadasho Soomaali, waaxda luqadaha, qaybta kaalmada adeegyada, waxantonette magallon hayfareed foster . So [facility] [phone].    ATENCIÓN: Si habla español, tiene a mayers disposición servicios gratuitos de asistencia lingüística. [facility] [phone].    We comply with applicable federal civil rights laws and [place] laws. We do not discriminate on the basis of race, color, national origin, age, disability, sex, sexual orientation, or gender identity.            Thank you!     Thank you for choosing [facility] EAR NOSE AND THROAT  for your care. Our goal is always to provide you with excellent care. Hearing back from our patients is one way we can continue to improve our services. Please take a few minutes to complete the written survey that you may receive in the mail after your visit with us. Thank you!             Your Updated Medication List - Protect others around you: Learn how to safely use, store and throw away your medicines at www.disposemymeds.org.          This list is accurate as of 9/19/18 11:59 PM.  Always use your most recent med list.                   Brand Name Dispense Instructions for use Diagnosis    ALLEGRA PO      Take 180 mg by mouth daily        cyclobenzaprine 10 MG tablet    FLEXERIL     cyclobenzaprine 10 mg tablet        fluticasone 50 MCG/ACT spray    FLONASE     Spray 1 spray into both nostrils daily        HYDROcodone-acetaminophen 5-325 MG per tablet    NORCO    8 tablet    Take 1 tablet by mouth every 6 hours as needed     Postoperative pain       naproxen sodium 220 MG tablet    ANAPROX     Take 440 mg by mouth        * predniSONE 10 MG tablet    DELTASONE     prednisone 10 mg tablet        * predniSONE 20 MG tablet    DELTASONE     prednisone 20 mg tablet        * Notice:  This list has 2 medication(s) that are the same as other medications prescribed for you. Read the directions carefully, and ask your doctor or other care provider to review them with you.

## 2018-09-19 NOTE — PATIENT INSTRUCTIONS
You will need  to schedule a follow up appointment as needed.       Please call our clinic for any questions,concerns,or worsening symptoms.      Clinic #[phone]       Option 3  for the ENT Care Team.       Option 1 for scheduling.    Yany OCHOA RNCC  [phone]

## 2018-09-19 NOTE — Clinical Note
9/19/2018       RE: Fermín Pineda  [address]  [place] [address]     Dear Colleague,    Thank you for referring your patient, Fermín Pineda, to the [facility] TH[facility] at [facility]. Please see a copy of my visit note below.    No notes on file    Again, thank you for allowing me to participate in the care of your patient.      Sincerely,    Brina Brambila MD

## 2018-09-19 NOTE — PROGRESS NOTES
AUDIOLOGY REPORT    SUMMARY: Audiology visit completed. See audiogram for results.      RECOMMENDATIONS: Follow-up with ENT.      Nadya Coats, F-AAA   Clinical Audiologist, [place] #[id number]   9/19/2018

## 2018-09-19 NOTE — MR AVS SNAPSHOT
After Visit Summary   9/19/2018    Fermín Pineda    MRN: [id number]           Patient Information     Date Of Birth          1960        Visit Information        Provider Department      9/19/2018 9:00 AM Mary Chamorro AuD M [facility] Audiology        Today's Diagnoses     Ear pain, right    -  1       Follow-ups after your visit        Your next 10 appointments already scheduled     Sep 19, 2018 10:00 AM CDT   (Arrive by 9:45 AM)   Return Visit with Brina Brambila MD   [facility] Ear Nose [facility] Throat ([facility])    [address] [place] [address] [phone]              Who to contact     Please call your clinic at [phone] to:    Ask questions about your health    Make or cancel appointments    Discuss your medicines    Learn about your test results    Speak to your doctor            Additional Information About Your Visit        MyChart Information     [employer] gives you secure access to your electronic health record. If you see a primary care provider, you can also send messages to your care team and make appointments. If you have questions, please call your primary care clinic.  If you do not have a primary care provider, please call [phone] and they will assist you.      [employer] is an electronic gateway that provides easy, online access to your medical records. With [employer], you can request a clinic appointment, read your test results, renew a prescription or communicate with your care team.     To access your existing account, please contact your [facility] or call [phone] for assistance.        Care EveryWhere ID     This is your Care EveryWhere ID. This could be used by other organizations to access your [place] medical records  [id number]         Blood Pressure from Last 3 Encounters:   08/16/18 122/85   07/27/18 135/86    Weight from Last 3 Encounters:   08/16/18 93.9 kg (207 lb)    07/27/18 97.1 kg (214 lb)   01/17/18 102.1 kg (225 lb)              We Performed the Following     AUDIOGRAM/TYMPANOGRAM - INTERFACE     AUDIOLOGY ADULT REFERRAL     [facility] Eval [facility] Speech Recog ([address])     Reduced 52 - Tymps / Reflex   ([address])        Primary Care Provider Office Phone # Fax #    Fermín Rojas [phone]       [facility] [address] [facility] [address]        Equal Access to Services     [facility]: Hadii aad ku hadasho Soomaali, waaxda luqadaha, qaybta kaalmada adeegyada, waxay idiin hayaan adeeg kharashahzad lagermán . So [facility] [phone].    ATENCIÓN: Si habla español, tiene a mayers disposición servicios gratuitos de asistencia lingüística. [facility] [phone].    We comply with applicable federal civil rights laws and [place] laws. We do not discriminate on the basis of race, color, national origin, age, disability, sex, sexual orientation, or gender identity.            Thank you!     Thank you for choosing [facility] AUDIOLOGY  for your care. Our goal is always to provide you with excellent care. Hearing back from our patients is one way we can continue to improve our services. Please take a few minutes to complete the written survey that you may receive in the mail after your visit with us. Thank you!             Your Updated Medication List - Protect others around you: Learn how to safely use, store and throw away your medicines at www.disposemymeds.org.          This list is accurate as of 9/19/18  9:57 AM.  Always use your most recent med list.                   Brand Name Dispense Instructions for use Diagnosis    ALLEGRA PO      Take 180 mg by mouth daily        cyclobenzaprine 10 MG tablet    FLEXERIL     cyclobenzaprine 10 mg tablet        fluticasone 50 MCG/ACT spray    FLONASE     Spray 1 spray into both nostrils daily        HYDROcodone-acetaminophen 5-325 MG per tablet    NORCO    8 tablet    Take 1 tablet by mouth every 6  hours as needed    Postoperative pain       naproxen sodium 220 MG tablet    ANAPROX     Take 440 mg by mouth        * predniSONE 10 MG tablet    DELTASONE     prednisone 10 mg tablet        * predniSONE 20 MG tablet    DELTASONE     prednisone 20 mg tablet        * Notice:  This list has 2 medication(s) that are the same as other medications prescribed for you. Read the directions carefully, and ask your doctor or other care provider to review them with you.

## 2018-09-19 NOTE — LETTER
9/19/2018      RE: Fermín Pineda  [address]  [place] [address]       Fermín Pineda is seen for an 8 week postoperative visit after left stapedotomy.  He reports his hearing is better.  There's no vertigo or imbalance.    Physical examination:  male in no acute distress.  Alert and answering questions appropriately.  HB 1/6 bilaterally.  Left ear examined under the microscope.  Ear canal well healed, TM intact with a well aerated middle ear, prosthesis visible over the incus.    Audiogram:  Normal right hearing.  Left normal/mild conductive hearing loss which is a definite improvement fro the moderate upsloping to mild loss preoperatively.  100% speech discrimination.    Assessment and plan:  Doing well.  No restrictions at this time.      Brina Brambila MD

## 2018-09-30 NOTE — PROGRESS NOTES
Fermín Pineda is seen for an 8 week postoperative visit after left stapedotomy.  He reports his hearing is better.  There's no vertigo or imbalance.    Physical examination:  male in no acute distress.  Alert and answering questions appropriately.  HB 1/6 bilaterally.  Left ear examined under the microscope.  Ear canal well healed, TM intact with a well aerated middle ear, prosthesis visible over the incus.    Audiogram:  Normal right hearing.  Left normal/mild conductive hearing loss which is a definite improvement fro the moderate upsloping to mild loss preoperatively.  100% speech discrimination.    Assessment and plan:  Doing well.  No restrictions at this time.

## 2020-03-11 ENCOUNTER — HEALTH MAINTENANCE LETTER (OUTPATIENT)
Age: 60
End: 2020-03-11

## 2020-10-19 ENCOUNTER — APPOINTMENT (OUTPATIENT)
Dept: URBAN - METROPOLITAN AREA CLINIC 220 | Age: 60
Setting detail: DERMATOLOGY
End: 2020-10-21

## 2020-10-19 DIAGNOSIS — L82.1 OTHER SEBORRHEIC KERATOSIS: ICD-10-CM

## 2020-10-19 DIAGNOSIS — D485 NEOPLASM OF UNCERTAIN BEHAVIOR OF SKIN: ICD-10-CM

## 2020-10-19 DIAGNOSIS — L81.4 OTHER MELANIN HYPERPIGMENTATION: ICD-10-CM

## 2020-10-19 DIAGNOSIS — D22 MELANOCYTIC NEVI: ICD-10-CM

## 2020-10-19 DIAGNOSIS — Z71.89 OTHER SPECIFIED COUNSELING: ICD-10-CM

## 2020-10-19 DIAGNOSIS — D18.0 HEMANGIOMA: ICD-10-CM

## 2020-10-19 PROBLEM — D18.01 HEMANGIOMA OF SKIN AND SUBCUTANEOUS TISSUE: Status: ACTIVE | Noted: 2020-10-19

## 2020-10-19 PROBLEM — D22.5 MELANOCYTIC NEVI OF TRUNK: Status: ACTIVE | Noted: 2020-10-19

## 2020-10-19 PROBLEM — D48.5 NEOPLASM OF UNCERTAIN BEHAVIOR OF SKIN: Status: ACTIVE | Noted: 2020-10-19

## 2020-10-19 PROCEDURE — 11102 TANGNTL BX SKIN SINGLE LES: CPT

## 2020-10-19 PROCEDURE — OTHER REASSURANCE: OTHER

## 2020-10-19 PROCEDURE — OTHER BIOPSY BY SHAVE METHOD: OTHER

## 2020-10-19 PROCEDURE — OTHER SUNSCREEN RECOMMENDATIONS: OTHER

## 2020-10-19 PROCEDURE — OTHER COUNSELING: OTHER

## 2020-10-19 PROCEDURE — OTHER MIPS QUALITY: OTHER

## 2020-10-19 PROCEDURE — 99203 OFFICE O/P NEW LOW 30 MIN: CPT | Mod: 25

## 2020-10-19 ASSESSMENT — LOCATION SIMPLE DESCRIPTION DERM
LOCATION SIMPLE: CHEST
LOCATION SIMPLE: RIGHT UPPER BACK
LOCATION SIMPLE: POSTERIOR NECK

## 2020-10-19 ASSESSMENT — LOCATION DETAILED DESCRIPTION DERM
LOCATION DETAILED: LEFT MEDIAL SUPERIOR CHEST
LOCATION DETAILED: RIGHT MID-UPPER BACK
LOCATION DETAILED: RIGHT SUPERIOR UPPER BACK
LOCATION DETAILED: LEFT SUPERIOR POSTERIOR NECK

## 2020-10-19 ASSESSMENT — LOCATION ZONE DERM
LOCATION ZONE: NECK
LOCATION ZONE: TRUNK

## 2020-10-19 NOTE — PROCEDURE: MIPS QUALITY
Quality 110: Preventive Care And Screening: Influenza Immunization: Influenza Immunization previously received during influenza season
Detail Level: Detailed
Quality 265: Biopsy Follow-Up: Biopsy results reviewed, communicated, tracked, and documented
Quality 130: Documentation Of Current Medications In The Medical Record: Current Medications Documented
Quality 431: Preventive Care And Screening: Unhealthy Alcohol Use - Screening: Patient screened for unhealthy alcohol use using a single question and scores less than 2 times per year
Quality 226: Preventive Care And Screening: Tobacco Use: Screening And Cessation Intervention: Patient screened for tobacco use and is an ex/non-smoker

## 2020-10-19 NOTE — PROCEDURE: BIOPSY BY SHAVE METHOD
Depth Of Biopsy: dermis
Size Of Lesion In Cm: 0.5
Hide Topical Anesthesia?: No
X Size Of Lesion In Cm: 0
Biopsy Type: H and E
Anesthesia Type: 1% lidocaine with epinephrine
Billing Type: Third-Party Bill
Was A Bandage Applied: Yes
Curettage Text: The wound bed was treated with curettage after the biopsy was performed.
Wound Care: Vaseline
Hemostasis: Aluminum Chloride
Type Of Destruction Used: Curettage
Information: Selecting Yes will display possible errors in your note based on the variables you have selected. This validation is only offered as a suggestion for you. PLEASE NOTE THAT THE VALIDATION TEXT WILL BE REMOVED WHEN YOU FINALIZE YOUR NOTE. IF YOU WANT TO FAX A PRELIMINARY NOTE YOU WILL NEED TO TOGGLE THIS TO 'NO' IF YOU DO NOT WANT IT IN YOUR FAXED NOTE.
Biopsy Method: Dermablade
Consent: Written consent was obtained and risks were reviewed including but not limited to scarring, infection, bleeding, scabbing, incomplete removal, nerve damage and allergy to anesthesia.
Dressing: bandage
Electrodesiccation And Curettage Text: The wound bed was treated with electrodesiccation and curettage after the biopsy was performed.
Silver Nitrate Text: The wound bed was treated with silver nitrate after the biopsy was performed.
Post-Care Instructions: I reviewed with the patient in detail post-care instructions. Patient is to keep the biopsy site dry overnight, and then apply bacitracin twice daily until healed. Patient may apply hydrogen peroxide soaks to remove any crusting.
Cryotherapy Text: The wound bed was treated with cryotherapy after the biopsy was performed.
Notification Instructions: Patient will be notified of biopsy results. However, patient instructed to call the office if not contacted within 2 weeks.
Electrodesiccation Text: The wound bed was treated with electrodesiccation after the biopsy was performed.
Detail Level: Detailed

## 2020-12-27 ENCOUNTER — HEALTH MAINTENANCE LETTER (OUTPATIENT)
Age: 60
End: 2020-12-27

## 2021-04-25 ENCOUNTER — HEALTH MAINTENANCE LETTER (OUTPATIENT)
Age: 61
End: 2021-04-25

## 2021-10-09 ENCOUNTER — HEALTH MAINTENANCE LETTER (OUTPATIENT)
Age: 61
End: 2021-10-09

## 2022-05-21 ENCOUNTER — HEALTH MAINTENANCE LETTER (OUTPATIENT)
Age: 62
End: 2022-05-21

## 2022-09-17 ENCOUNTER — HEALTH MAINTENANCE LETTER (OUTPATIENT)
Age: 62
End: 2022-09-17

## 2023-06-04 ENCOUNTER — HEALTH MAINTENANCE LETTER (OUTPATIENT)
Age: 63
End: 2023-06-04

## (undated) DEVICE — PREP POVIDONE IODINE SCRUB 7.5% 120ML

## (undated) DEVICE — SOL WATER IRRIG 500ML BOTTLE 2F7113

## (undated) DEVICE — SUCTION MANIFOLD NEPTUNE 2 SYS 4 PORT 0702-020-000

## (undated) DEVICE — DRAPE MICROSCOPE LEICA 54X150" AR8033650

## (undated) DEVICE — PREP SKIN SCRUB TRAY 4461A

## (undated) DEVICE — LINEN TOWEL PACK X5 5464

## (undated) DEVICE — PREP PAD ALCOHOL 6818

## (undated) DEVICE — PREP POVIDONE IODINE SOLUTION 10% 120ML

## (undated) DEVICE — PACK EAR CUSTOM ASC

## (undated) DEVICE — SOL NACL 0.9% IRRIG 500ML BOTTLE 2F7123

## (undated) DEVICE — NDL ANGIOCATH 14GA 1.25" 4048

## (undated) DEVICE — SPONGE SURGIFOAM 100 1974

## (undated) DEVICE — DRSG COTTON BALL 6PK LCB62

## (undated) DEVICE — SYR 10ML LL W/O NDL

## (undated) DEVICE — DRSG BANDAID 1X3" FABRIC CURITY LATEX FREE KC44101

## (undated) DEVICE — BLADE KNIFE BEAVER TYMPANOPLASTY 2.5MM W/60D DOWN 377200

## (undated) DEVICE — DRAPE WARMER 66X44" ORS-300

## (undated) DEVICE — NIM ELEC SUBDERMAL NDL 3PAIR/BOX

## (undated) DEVICE — DRAPE EAR CHRONIC LATEX FREE 3609

## (undated) DEVICE — GLOVE PROTEXIS POWDER FREE SMT 6.5  2D72PT65X

## (undated) DEVICE — DRSG TEGADERM 2 3/8X2 3/4" 1624W

## (undated) DEVICE — WIPE INSTRUMENT MEROCEL 400200

## (undated) RX ORDER — FENTANYL CITRATE 50 UG/ML
INJECTION, SOLUTION INTRAMUSCULAR; INTRAVENOUS
Status: DISPENSED
Start: 2018-07-27

## (undated) RX ORDER — PROPOFOL 10 MG/ML
INJECTION, EMULSION INTRAVENOUS
Status: DISPENSED
Start: 2018-07-27

## (undated) RX ORDER — OXYCODONE HYDROCHLORIDE 5 MG/1
TABLET ORAL
Status: DISPENSED
Start: 2018-07-27

## (undated) RX ORDER — DEXAMETHASONE SODIUM PHOSPHATE 4 MG/ML
INJECTION, SOLUTION INTRA-ARTICULAR; INTRALESIONAL; INTRAMUSCULAR; INTRAVENOUS; SOFT TISSUE
Status: DISPENSED
Start: 2018-07-27

## (undated) RX ORDER — GABAPENTIN 300 MG/1
CAPSULE ORAL
Status: DISPENSED
Start: 2018-07-27

## (undated) RX ORDER — REMIFENTANIL HYDROCHLORIDE 1 MG/ML
INJECTION, POWDER, LYOPHILIZED, FOR SOLUTION INTRAVENOUS
Status: DISPENSED
Start: 2018-07-27

## (undated) RX ORDER — ACETAMINOPHEN 325 MG/1
TABLET ORAL
Status: DISPENSED
Start: 2018-07-27

## (undated) RX ORDER — BACITRACIN 500 [USP'U]/G
OINTMENT OPHTHALMIC
Status: DISPENSED
Start: 2018-07-27

## (undated) RX ORDER — ONDANSETRON 2 MG/ML
INJECTION INTRAMUSCULAR; INTRAVENOUS
Status: DISPENSED
Start: 2018-07-27

## (undated) RX ORDER — EPINEPHRINE NASAL SOLUTION 1 MG/ML
SOLUTION NASAL
Status: DISPENSED
Start: 2018-07-27

## (undated) RX ORDER — LIDOCAINE HYDROCHLORIDE 20 MG/ML
INJECTION, SOLUTION EPIDURAL; INFILTRATION; INTRACAUDAL; PERINEURAL
Status: DISPENSED
Start: 2018-07-27